# Patient Record
Sex: MALE | Race: BLACK OR AFRICAN AMERICAN | URBAN - METROPOLITAN AREA
[De-identification: names, ages, dates, MRNs, and addresses within clinical notes are randomized per-mention and may not be internally consistent; named-entity substitution may affect disease eponyms.]

---
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
## 2020-03-11 ENCOUNTER — HOSPITAL ENCOUNTER (OUTPATIENT)
Dept: GENERAL RADIOLOGY | Age: 20
Discharge: HOME | End: 2020-03-11
Attending: FAMILY MEDICINE
Payer: MEDICAID

## 2020-03-11 ENCOUNTER — OFFICE VISIT (OUTPATIENT)
Dept: FAMILY MEDICINE CLINIC | Facility: CLINIC | Age: 20
End: 2020-03-11
Payer: MEDICAID

## 2020-03-11 VITALS
WEIGHT: 183.38 LBS | HEIGHT: 73 IN | TEMPERATURE: 98 F | HEART RATE: 69 BPM | BODY MASS INDEX: 24.3 KG/M2 | SYSTOLIC BLOOD PRESSURE: 123 MMHG | DIASTOLIC BLOOD PRESSURE: 74 MMHG

## 2020-03-11 DIAGNOSIS — S69.92XA HAND INJURY, LEFT, INITIAL ENCOUNTER: Primary | ICD-10-CM

## 2020-03-11 DIAGNOSIS — S69.92XA HAND INJURY, LEFT, INITIAL ENCOUNTER: ICD-10-CM

## 2020-03-11 PROCEDURE — 99213 OFFICE O/P EST LOW 20 MIN: CPT | Performed by: FAMILY MEDICINE

## 2020-03-11 PROCEDURE — 73130 X-RAY EXAM OF HAND: CPT | Performed by: FAMILY MEDICINE
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
## 2020-03-19 ENCOUNTER — OFFICE VISIT (OUTPATIENT)
Dept: SURGERY | Facility: CLINIC | Age: 20
End: 2020-03-19
Payer: MEDICAID

## 2020-03-19 DIAGNOSIS — S62.361A CLOSED NONDISPLACED FRACTURE OF NECK OF SECOND METACARPAL BONE OF LEFT HAND, INITIAL ENCOUNTER: Primary | ICD-10-CM

## 2020-03-19 PROCEDURE — 29125 APPL SHORT ARM SPLINT STATIC: CPT | Performed by: OCCUPATIONAL THERAPIST

## 2020-03-19 PROCEDURE — 99243 OFF/OP CNSLTJ NEW/EST LOW 30: CPT | Performed by: PLASTIC SURGERY

## 2020-03-19 NOTE — H&P
Jael Pfeiffer is a 22year old male that presents with Patient presents with: Injury: Left hand   .     REFERRED BY:  Ara De Luna    Pacemaker: No  Latex Allergy: no  Coumadin: No  Plavix: No  Other anticoagulants: No  Cardiac stents: No    HAND DOMIN • OTHER      Right shoulder arthroscopy, anterior labral repair        ALLERIGIES  No Known Allergies     MEDICATIONS  No current outpatient medications on file.         SOCIAL HISTORY  Social History    Tobacco Use      Smoking status: Never Smoker any problems or concerns.             3/19/2020  Kirill Alcantar MD
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
## 2021-04-07 ENCOUNTER — OFFICE VISIT (OUTPATIENT)
Dept: FAMILY MEDICINE CLINIC | Facility: CLINIC | Age: 21
End: 2021-04-07
Payer: MEDICAID

## 2021-04-07 VITALS
HEIGHT: 73 IN | DIASTOLIC BLOOD PRESSURE: 76 MMHG | BODY MASS INDEX: 28.41 KG/M2 | WEIGHT: 214.38 LBS | SYSTOLIC BLOOD PRESSURE: 110 MMHG | TEMPERATURE: 97 F | HEART RATE: 84 BPM

## 2021-04-07 DIAGNOSIS — M76.52 PATELLAR TENDINITIS OF LEFT KNEE: Primary | ICD-10-CM

## 2021-04-07 PROCEDURE — 3008F BODY MASS INDEX DOCD: CPT | Performed by: FAMILY MEDICINE

## 2021-04-07 PROCEDURE — 3078F DIAST BP <80 MM HG: CPT | Performed by: FAMILY MEDICINE

## 2021-04-07 PROCEDURE — 99212 OFFICE O/P EST SF 10 MIN: CPT | Performed by: FAMILY MEDICINE

## 2021-04-07 PROCEDURE — 3074F SYST BP LT 130 MM HG: CPT | Performed by: FAMILY MEDICINE

## 2021-04-07 RX ORDER — NAPROXEN 500 MG/1
500 TABLET ORAL 2 TIMES DAILY WITH MEALS
Qty: 60 TABLET | Refills: 0 | Status: SHIPPED | OUTPATIENT
Start: 2021-04-07

## 2021-04-12 NOTE — PROGRESS NOTES
Arsenio Rock is a 23year old male.   Patient presents with:  Knee Pain: left knee pain x2-3months, worsening over the last couple months, patient denies specific event or injury that caused this issue        HPI:   Patient is a 68-year-old male who pre tendinitis of left knee  Suggest a a knee sleeve made out of neoprene for patient for when he exercises. Also will give Naprosyn to take twice a day for 5 days with food in his stomach. Patient may ice the area also.   Patient will return if there is no i

## 2021-05-27 ENCOUNTER — OFFICE VISIT (OUTPATIENT)
Dept: FAMILY MEDICINE CLINIC | Facility: CLINIC | Age: 21
End: 2021-05-27
Payer: MEDICAID

## 2021-05-27 VITALS
DIASTOLIC BLOOD PRESSURE: 76 MMHG | SYSTOLIC BLOOD PRESSURE: 122 MMHG | HEIGHT: 73 IN | WEIGHT: 217.63 LBS | BODY MASS INDEX: 28.84 KG/M2 | TEMPERATURE: 97 F | HEART RATE: 69 BPM

## 2021-05-27 DIAGNOSIS — K40.90 NON-RECURRENT UNILATERAL INGUINAL HERNIA WITHOUT OBSTRUCTION OR GANGRENE: Primary | ICD-10-CM

## 2021-05-27 PROCEDURE — 3008F BODY MASS INDEX DOCD: CPT | Performed by: FAMILY MEDICINE

## 2021-05-27 PROCEDURE — 99212 OFFICE O/P EST SF 10 MIN: CPT | Performed by: FAMILY MEDICINE

## 2021-05-27 PROCEDURE — 3078F DIAST BP <80 MM HG: CPT | Performed by: FAMILY MEDICINE

## 2021-05-27 PROCEDURE — 3074F SYST BP LT 130 MM HG: CPT | Performed by: FAMILY MEDICINE

## 2021-05-27 NOTE — PROGRESS NOTES
Magaly Mckee is a 23year old male. 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 He has had 1 day ago pain in his left groin the day seem to get worse.   Now the patient has on internal inguinal canal exam.  No hernia palpated. Hip: Pain with abduction of left hip.       ASSESSMENT AND PLAN:   1. Non-recurrent unilateral inguinal hernia without obstruction or gangrene  Patient is very tender when doing a hernia exam in the int

## 2021-06-01 ENCOUNTER — OFFICE VISIT (OUTPATIENT)
Dept: SURGERY | Facility: CLINIC | Age: 21
End: 2021-06-01
Payer: MEDICAID

## 2021-06-01 VITALS — HEIGHT: 73 IN | TEMPERATURE: 98 F | BODY MASS INDEX: 28.76 KG/M2 | WEIGHT: 217 LBS

## 2021-06-01 DIAGNOSIS — R10.32 GROIN PAIN, LEFT: Primary | ICD-10-CM

## 2021-06-01 PROCEDURE — 99203 OFFICE O/P NEW LOW 30 MIN: CPT | Performed by: SURGERY

## 2021-06-01 PROCEDURE — 3008F BODY MASS INDEX DOCD: CPT | Performed by: SURGERY

## 2021-06-01 NOTE — H&P
History and Physical      HPI   Patient presents with:  Referral: Patient referred from Dr. Juan Antonio Juarez for possiible B-Inguinal Hernia. Patient reports increased pain with twisting and activity. Patient having normal BM;s and urination.         HPI  Maryjane responsive no acute distress noted  Head/Face: normocephalic  Nose/Mouth/Throat: nose and throat are clear palate is intact mucous membranes are moist no oral lesions are noted  Neck/Thyroid: neck is supple without adenopathy  Respiratory: normal to inspec

## 2022-05-06 ENCOUNTER — NURSE TRIAGE (OUTPATIENT)
Dept: FAMILY MEDICINE CLINIC | Facility: CLINIC | Age: 22
End: 2022-05-06

## 2023-03-22 ENCOUNTER — TELEPHONE (OUTPATIENT)
Dept: FAMILY MEDICINE CLINIC | Facility: CLINIC | Age: 23
End: 2023-03-22
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
## 2023-03-23 ENCOUNTER — OFFICE VISIT (OUTPATIENT)
Dept: FAMILY MEDICINE CLINIC | Facility: CLINIC | Age: 23
End: 2023-03-23

## 2023-03-23 VITALS
BODY MASS INDEX: 29 KG/M2 | DIASTOLIC BLOOD PRESSURE: 77 MMHG | SYSTOLIC BLOOD PRESSURE: 135 MMHG | WEIGHT: 218 LBS | HEART RATE: 82 BPM | TEMPERATURE: 98 F | RESPIRATION RATE: 16 BRPM
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
## 2023-03-23 DIAGNOSIS — M79.605 LEFT LEG PAIN: Primary | ICD-10-CM

## 2023-03-23 DIAGNOSIS — R21 RASH: ICD-10-CM

## 2023-03-23 PROCEDURE — 3078F DIAST BP <80 MM HG: CPT | Performed by: FAMILY MEDICINE

## 2023-03-23 PROCEDURE — 3075F SYST BP GE 130 - 139MM HG: CPT | Performed by: FAMILY MEDICINE

## 2023-03-23 PROCEDURE — 99214 OFFICE O/P EST MOD 30 MIN: CPT | Performed by: FAMILY MEDICINE

## 2023-03-23 RX ORDER — NAPROXEN 500 MG/1
500 TABLET ORAL 2 TIMES DAILY WITH MEALS
Qty: 14 TABLET | Refills: 1 | Status: SHIPPED | OUTPATIENT
Start: 2023-03-23 | End: 2023-03-30

## 2024-07-22 ENCOUNTER — OFFICE VISIT (OUTPATIENT)
Dept: FAMILY MEDICINE CLINIC | Facility: CLINIC | Age: 24
End: 2024-07-22

## 2024-07-22 VITALS
RESPIRATION RATE: 18 BRPM | OXYGEN SATURATION: 98 % | WEIGHT: 230 LBS | HEART RATE: 73 BPM | TEMPERATURE: 98 F | DIASTOLIC BLOOD PRESSURE: 76 MMHG | BODY MASS INDEX: 30 KG/M2 | SYSTOLIC BLOOD PRESSURE: 126 MMHG

## 2024-07-22 DIAGNOSIS — H00.011 HORDEOLUM EXTERNUM OF RIGHT UPPER EYELID: ICD-10-CM

## 2024-07-22 DIAGNOSIS — M99.02 THORACIC REGION SOMATIC DYSFUNCTION: ICD-10-CM

## 2024-07-22 DIAGNOSIS — N47.1 PHIMOSIS: ICD-10-CM

## 2024-07-22 DIAGNOSIS — G89.29 CHRONIC PAIN OF RIGHT KNEE: Primary | ICD-10-CM

## 2024-07-22 DIAGNOSIS — M25.561 CHRONIC PAIN OF RIGHT KNEE: Primary | ICD-10-CM

## 2024-07-22 DIAGNOSIS — M25.361 INSTABILITY OF KNEE JOINT, RIGHT: ICD-10-CM

## 2024-07-22 NOTE — PROGRESS NOTES
Subjective:     Patient ID: Jeovany Paredes is a 23 year old male.    Patient is a 23-year-old -American gentleman who presents to the clinic with a 1 year complaint/onset of right knee \"pop\".  Patient's lateralization has been generalized and it has quieted to his sports activity. There is occasional buckling with sports.  Squatting with lifting is a challenge to do.    Probable stye right upper lid which is trying to granulate-referred to ophthalmology.    4 years of persistent lying and a resultant right side midthoracic pain. No trauma related.    Genital foreskin tears and scarring. Patient treated for balanitis in the past. Referred for circumcision.        History/Other:   Review of Systems  No current outpatient medications on file.     Allergies:No Known Allergies    No past medical history on file.   Past Surgical History:   Procedure Laterality Date    Other      Right shoulder arthroscopy, anterior labral repair      No family history on file.   Social History:   Social History     Socioeconomic History    Marital status: Single   Tobacco Use    Smoking status: Every Day     Current packs/day: 0.50     Types: Cigarettes    Smokeless tobacco: Never   Vaping Use    Vaping status: Every Day    Substances: Nicotine    Devices: Disposable   Substance and Sexual Activity    Alcohol use: Yes     Comment: socially    Drug use: Yes     Types: Cannabis     Comment: weekly    Other Topics Concern    Left Handed Yes    Right Handed No    Currently spends a great deal of time in the sun No    History of tanning No    Hx of Spending Great Deal of Time in Sun No    Bad sunburns in the past No    Tanning Salons in the Past No    Hx of Radiation Treatments No     Social Determinants of Health      Received from [facility]        Objective:   Vitals:    07/22/24 1811   BP: 126/76   Pulse: 73   Resp: 18   Temp: 98.2 °F (36.8 °C)       Physical Exam  Constitutional:       Appearance: Normal  appearance.   HENT:      Head: Normocephalic and atraumatic.      Right Ear: Tympanic membrane normal.      Left Ear: Tympanic membrane normal.      Nose: Nose normal.      Mouth/Throat:      Mouth: Mucous membranes are moist.   Eyes:      General:         Right eye: Hordeolum present.      Comments: Upper lid stye with partial granulation.   Neck:      Thyroid: No thyromegaly.   Cardiovascular:      Rate and Rhythm: Normal rate and regular rhythm.      Heart sounds: Normal heart sounds.   Pulmonary:      Breath sounds: Normal breath sounds.   Musculoskeletal:      Right knee:      Instability Tests: Lateral Kim test positive.        Legs:       Comments: Lateral Kim's versus ligamentous laxity involving the head of the fibula on the left.   Neurological:      Mental Status: He is alert.         Assessment & Plan:   1. Chronic pain of right knee  Ordered.  - MRI KNEE, RIGHT (CPT=73721); Future    2. Phimosis  Will likely need circumcision.  Referred.  - Urology Referral - In Network    3. Hordeolum externum of right upper eyelid  Referred.  - Ophthalmology Referral - In Network    4. Instability of knee joint, right  Ordered.  - MRI KNEE, RIGHT (CNP=27593); Future    5. Thoracic region somatic dysfunction  Osteopathic manipulation performed in the thoracic region and immediate release was obtained.  - OSTEOPATHIC MANIP,1-2 BODY REGN      No orders of the defined types were placed in this encounter.      Meds This Visit:  Requested Prescriptions      No prescriptions requested or ordered in this encounter       Imaging & Referrals:  None     Patient Instructions   Patient referred to urology for circumcision evaluation.  Patient referred to ophthalmology for treatment for her upper lid stye on the left.  MRI of the right knee recommended.  Patient will likely need to see orthopedics pending results of MRI.  Osteopathic manipulation performed in the thoracic region.    Return in about 6 weeks (around  9/2/2024), or if symptoms worsen or fail to improve.

## 2024-07-23 NOTE — PATIENT INSTRUCTIONS
Patient referred to urology for circumcision evaluation.  Patient referred to ophthalmology for treatment for her upper lid stye on the left.  MRI of the right knee recommended.  Patient will likely need to see orthopedics pending results of MRI.  Osteopathic manipulation performed in the thoracic region.

## 2024-08-09 ENCOUNTER — OFFICE VISIT (OUTPATIENT)
Dept: SURGERY | Facility: CLINIC | Age: 24
End: 2024-08-09

## 2024-08-09 DIAGNOSIS — N48.1 BALANITIS: Primary | ICD-10-CM

## 2024-08-09 PROCEDURE — 99204 OFFICE O/P NEW MOD 45 MIN: CPT | Performed by: UROLOGY

## 2024-08-09 NOTE — PROGRESS NOTES
Claudia Strickland MD  Department of Urology  [address] [place] [address]., [address]  [place] [address]    T: [phone]  F: [phone]    To: John Ocampo DO   [address] [facility] [address]  [facility] [address]    Re: Jeovany Paredes   MRN: [id number]  : 2000    Dear John Ocampo DO,    Today I had the pleasure of seeing Jeovany Paredes in my clinic. As you know, Mr. Paredes is a pleasant 23 year old year old male who I am seeing for phimosis. Patient was last seen in this department on Visit date not found.    Briefly, patient states that he has blisters and lesions on his penis that occur occasionally.  They resolve with ointments.  He occasionally has difficulty retracting his foreskin.  This has not been an issue for him at this time.       PAST MEDICAL HISTORY:  No past medical history on file.     PAST SURGICAL HISTORY:  Past Surgical History:   Procedure Laterality Date    Other      Right shoulder arthroscopy, anterior labral repair         ALLERGIES:  No Known Allergies      MEDICATIONS:  No current outpatient medications     FAMILY HISTORY:  No family history on file.     SOCIAL HISTORY:  Social History     Socioeconomic History    Marital status: Single   Tobacco Use    Smoking status: Every Day     Current packs/day: 0.50     Types: Cigarettes    Smokeless tobacco: Never   Vaping Use    Vaping status: Every Day    Substances: Nicotine    Devices: Disposable   Substance and Sexual Activity    Alcohol use: Yes     Comment: socially    Drug use: Yes     Types: Cannabis     Comment: weekly    Other Topics Concern    Left Handed Yes    Right Handed No    Currently spends a great deal of time in the sun No    History of tanning No    Hx of Spending Great Deal of Time in Sun No    Bad sunburns in the past No    Tanning Salons in the Past No    Hx of Radiation Treatments No     Social Determinants of Health      [facility]          PHYSICAL EXAMINATION:  There  were no vitals filed for this visit.  CONSTITUTIONAL: No apparent distress, cooperative and communicative  NEUROLOGIC: Alert and oriented   HEAD: Normocephalic, atraumatic   EYES: Sclera non-icteric   ENT: Hearing intact, moist mucous membranes   NECK: No obvious goiter or masses   RESPIRATORY: Normal respiratory effort, Nonlabored breathing on room air  SKIN: No evident rashes   ABDOMEN: Soft, nontender, nondistended, no rebound tenderness, no guarding, no masses  GENITOURINARY: No significant phimosis, no significant balanitis or lesions seen on foreskin or glans penis    REVIEW OF SYSTEMS:    A comprehensive 10-point review of systems was completed.  Pertinent positives and negatives are noted in the the HPI.       LABORATORY DATA:  none        IMAGING REVIEW:  none     OTHER RELEVANT DATA:   none     IMPRESSION: What sounds like recurrent balanitis with no phimosis seen on examination today.  Discussed Lotrisone cream for recurrent balanitis versus circumcision.  For the time being he will monitor and if he needs any cream or would like a circumcision he can always return to our clinic for reevaluation    Discussed the procedure in detail and that it is done in the operating room.  I mention that he would have dissolvable sutures, and a bandage can be removed in 48 hours.  The risks of the procedure include bleeding, infection, damage to surrounding structures (penis, glans penis, urethra, shaft of penis), need for additional procedures, dislike with cosmetic appearance, slight excess foreskin, penile curvature, cicatrix, different sexual feeling, anesthesia complications.       PLAN:  Patient to continue with conservative management  He will return to clinic if symptoms worsen or recur in which case we can consider Lotrisone cream or circumcision    Thank you for referring this very pleasant patient to my clinic. If you have any questions or concerns, please do not hesitate to contact me.    Sincerely,  Claudia  MD Marco A    30 minutes were spent on this patient at this visit obtaining a history, reviewing medical records, developing a treatment plan, counseling and discussing treatment strategy with patient, coordination of care and documentation.     The 21st Century Cures Act makes medical notes available to patients in the interest of transparency.  However, please be advised that this is a medical document.  It is intended as a peer to peer communication.  It is written in medical language and may contain abbreviations or verbiage that are technical and unfamiliar.  It may appear blunt or direct.  Medical documents are intended to carry relevant information, facts as evident, and the clinical opinion of the practitioner.

## (undated) NOTE — LETTER
5/27/2021              [facility] [address] [address]         To Whom It May Concern,    Chuyita Daley is currently under my medical care. Please excuse Shiv Hess from work on 5/27/21 and 5/31/21.     Please contact our o
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
## (undated) NOTE — LETTER
3/23/2023          To Whom It May Concern:    William Bruner is currently under my medical care and may not return to work at this time. Please excuse Payal Mccullough for 5 days. He may return to work on 3/27/2023. Activity is restricted as follows: none. If you require additional information please contact our office.         Sincerely,          Lio Baker DO          Document generated by:  Lio Baker DO